# Patient Record
Sex: FEMALE | Race: BLACK OR AFRICAN AMERICAN | NOT HISPANIC OR LATINO | Employment: UNEMPLOYED | URBAN - METROPOLITAN AREA
[De-identification: names, ages, dates, MRNs, and addresses within clinical notes are randomized per-mention and may not be internally consistent; named-entity substitution may affect disease eponyms.]

---

## 2017-10-26 ENCOUNTER — HOSPITAL ENCOUNTER (EMERGENCY)
Facility: HOSPITAL | Age: 27
Discharge: HOME | End: 2017-10-26
Attending: EMERGENCY MEDICINE
Payer: MEDICAID

## 2017-10-26 VITALS
SYSTOLIC BLOOD PRESSURE: 168 MMHG | BODY MASS INDEX: 52.14 KG/M2 | WEIGHT: 258.63 LBS | TEMPERATURE: 98 F | HEIGHT: 59 IN | OXYGEN SATURATION: 100 % | HEART RATE: 85 BPM | DIASTOLIC BLOOD PRESSURE: 76 MMHG | RESPIRATION RATE: 20 BRPM

## 2017-10-26 DIAGNOSIS — L02.416 ABSCESS OF LEFT THIGH: Primary | ICD-10-CM

## 2017-10-26 DIAGNOSIS — L28.2 PRURITIC RASH: ICD-10-CM

## 2017-10-26 PROCEDURE — 99283 EMERGENCY DEPT VISIT LOW MDM: CPT

## 2017-10-26 RX ORDER — FLUOCINONIDE 0.5 MG/G
CREAM TOPICAL 2 TIMES DAILY
Qty: 15 G | Refills: 0 | Status: SHIPPED | OUTPATIENT
Start: 2017-10-26 | End: 2017-11-05

## 2017-10-26 RX ORDER — SULFAMETHOXAZOLE AND TRIMETHOPRIM 800; 160 MG/1; MG/1
2 TABLET ORAL 2 TIMES DAILY
Qty: 28 TABLET | Refills: 0 | Status: SHIPPED | OUTPATIENT
Start: 2017-10-26 | End: 2017-11-02

## 2017-10-26 NOTE — ED PROVIDER NOTES
SCRIBE #1 NOTE: I, Dann Lopez, am scribing for, and in the presence of, Armando Hubbard MD. I have scribed the entire note.      History      Chief Complaint   Patient presents with    Rash     pt states she has a rash on her chest x1 week    Cyst     pt states she has cyst on her L thigh x1 week       Review of patient's allergies indicates:  No Known Allergies     HPI   HPI    10/26/2017, 11:35 AM   History obtained from the patient      History of Present Illness: Luz CULVER is a 27 y.o. female patient who presents to the Emergency Department for an evaluation of a rash which onset gradually x1 week ago. Symptoms are constant and moderate in severity. Exacerbated by nothing and relieved by nothing. Associated sxs include cyst to left thigh for x1 week. Patient denies any fever, chills, difficulty swallowing, tongue swelling, voice change, SOB, and all other sxs at this time. No further complaints or concerns at this time.         Arrival mode: Personal vehicle     PCP: Primary Doctor No       Past Medical History:  Past Medical History:   Diagnosis Date    Diabetes mellitus        Past Surgical History:  Past Surgical History:   Procedure Laterality Date    CERVICAL DISC SURGERY           Family History:  Family history reviewed not relevant      Social History:  Social History     Social History Main Topics    Smoking status: Never Smoker    Smokeless tobacco: Never Used    Alcohol use No    Drug use: No    Sexual activity: Yes     Partners: Male     Birth control/ protection: None       ROS   Review of Systems   Constitutional: Negative for chills, diaphoresis and fever.   HENT: Negative for drooling, facial swelling, sore throat, trouble swallowing and voice change.         (-) Tongue swelling   Respiratory: Negative for cough, chest tightness and shortness of breath.    Cardiovascular: Negative for chest pain.   Gastrointestinal: Negative for nausea and vomiting.   Genitourinary:  "Negative for dysuria and hematuria.   Musculoskeletal: Negative for back pain, neck pain and neck stiffness.   Skin: Positive for rash. Negative for color change and wound.        (+) Cyst   Neurological: Negative for weakness, light-headedness and headaches.   Hematological: Does not bruise/bleed easily.     Physical Exam      Initial Vitals [10/26/17 1131]   BP Pulse Resp Temp SpO2   (!) 193/129 96 18 98.1 °F (36.7 °C) 100 %      MAP       150.33          Physical Exam  Nursing Notes and Vital Signs Reviewed.  Constitutional: Patient is in no acute distress. Well-developed and well-nourished.  Head: Atraumatic. Normocephalic.  Eyes: PERRL. EOM intact. Conjunctivae are not pale. No scleral icterus.  ENT: Mucous membranes are moist. Oropharynx is clear and symmetric.    Neck: Supple. Full ROM. No lymphadenopathy.  Cardiovascular: Regular rate. Regular rhythm.  Pulmonary/Chest: No respiratory distress.  Abdominal: Soft and non-distended.  There is no tenderness.  Musculoskeletal: Moves all extremities. No obvious deformities. No edema. No calf tenderness.  Skin: Warm and dry. 0.5 area of induration noted to left thigh with drainage. No I&D needed. Diffuse urticarial rash to medial aspect  of both breasts.  Neurological:  Alert, awake, and appropriate.  Normal speech.  No acute focal neurological deficits are appreciated.  Psychiatric: Normal affect. Good eye contact. Appropriate in content.    ED Course    Procedures  ED Vital Signs:  Vitals:    10/26/17 1131 10/26/17 1224   BP: (!) 193/129 (!) 168/76   Pulse: 96 85   Resp: 18 20   Temp: 98.1 °F (36.7 °C)    TempSrc: Oral    SpO2: 100%    Weight: 117.3 kg (258 lb 9.6 oz)    Height: 4' 11" (1.499 m)             The Emergency Provider reviewed the vital signs and test results, which are outlined above.    ED Discussion      12:04 PM: Reassessed pt at this time. Pt is awake, alert, and in NAD. Discussed with pt all pertinent ED information. Discussed pt dx of abscess of " left thigh and plan of tx. Gave pt all f/u and return to the ED instructions. All questions and concerns were addressed at this time. Pt expresses understanding of information and instructions, and is comfortable with plan to discharge. Pt is stable for discharge.    Patient is safe for discharge. There is no suggestion of airway or ENT emergency. Patient is hemodynamically stable and there is no suggestion of active anaphylaxis or progressive worsening of current symptoms.      ED Medication(s):  Medications - No data to display    Discharge Medication List as of 10/26/2017 12:02 PM      START taking these medications    Details   fluocinonide 0.05% (LIDEX) 0.05 % cream Apply topically 2 (two) times daily., Starting u 10/26/2017, Until Sun 11/5/2017, Print      sulfamethoxazole-trimethoprim 800-160mg (BACTRIM DS) 800-160 mg Tab Take 2 tablets by mouth 2 (two) times daily., Starting u 10/26/2017, Until u 11/2/2017, Print             Follow-up Information     [facility] in 2 days.    Contact information:  [address] [facility] [address] [phone]                     Medical Decision Making              Scribe Attestation:   Scribe #1: I performed the above scribed service and the documentation accurately describes the services I performed. I attest to the accuracy of the note.    Attending:   Physician Attestation Statement for Scribe #1: I, Armando Hubbard MD, personally performed the services described in this documentation, as scribed by Dann Lopez, in my presence, and it is both accurate and complete.          Clinical Impression       ICD-10-CM ICD-9-CM   1. Abscess of left thigh L02.416 682.6   2. Pruritic rash L28.2 698.2       Disposition:   Disposition: Discharged  Condition: Stable         Armando Hubbard MD  10/26/17 5238

## 2018-03-19 ENCOUNTER — HOSPITAL ENCOUNTER (EMERGENCY)
Facility: HOSPITAL | Age: 28
Discharge: HOME | End: 2018-03-19
Attending: EMERGENCY MEDICINE
Payer: MEDICAID

## 2018-03-19 VITALS
TEMPERATURE: 99 F | OXYGEN SATURATION: 100 % | DIASTOLIC BLOOD PRESSURE: 103 MMHG | BODY MASS INDEX: 52.17 KG/M2 | SYSTOLIC BLOOD PRESSURE: 140 MMHG | RESPIRATION RATE: 16 BRPM | WEIGHT: 258.81 LBS | HEIGHT: 59 IN | HEART RATE: 103 BPM

## 2018-03-19 DIAGNOSIS — L02.416 ABSCESS OF LEG, LEFT: Primary | ICD-10-CM

## 2018-03-19 DIAGNOSIS — I10 ESSENTIAL HYPERTENSION: ICD-10-CM

## 2018-03-19 LAB — POCT GLUCOSE: 124 MG/DL (ref 70–110)

## 2018-03-19 PROCEDURE — 25000003 PHARM REV CODE 250: Performed by: NURSE PRACTITIONER

## 2018-03-19 PROCEDURE — 82962 GLUCOSE BLOOD TEST: CPT

## 2018-03-19 PROCEDURE — 10060 I&D ABSCESS SIMPLE/SINGLE: CPT

## 2018-03-19 PROCEDURE — 99284 EMERGENCY DEPT VISIT MOD MDM: CPT | Mod: 25

## 2018-03-19 RX ORDER — SULFAMETHOXAZOLE AND TRIMETHOPRIM 800; 160 MG/1; MG/1
1 TABLET ORAL 2 TIMES DAILY
Qty: 14 TABLET | Refills: 0 | Status: SHIPPED | OUTPATIENT
Start: 2018-03-19 | End: 2018-03-26

## 2018-03-19 RX ORDER — HYDROCHLOROTHIAZIDE 25 MG/1
25 TABLET ORAL DAILY
Qty: 30 TABLET | Refills: 0 | Status: SHIPPED | OUTPATIENT
Start: 2018-03-19 | End: 2018-04-18

## 2018-03-19 RX ORDER — LIDOCAINE HYDROCHLORIDE 10 MG/ML
10 INJECTION, SOLUTION EPIDURAL; INFILTRATION; INTRACAUDAL; PERINEURAL
Status: COMPLETED | OUTPATIENT
Start: 2018-03-19 | End: 2018-03-19

## 2018-03-19 RX ORDER — TRAMADOL HYDROCHLORIDE 50 MG/1
50 TABLET ORAL EVERY 6 HOURS PRN
Qty: 14 TABLET | Refills: 0 | Status: SHIPPED | OUTPATIENT
Start: 2018-03-19 | End: 2018-03-29

## 2018-03-19 RX ORDER — CLONIDINE HYDROCHLORIDE 0.1 MG/1
0.1 TABLET ORAL
Status: COMPLETED | OUTPATIENT
Start: 2018-03-19 | End: 2018-03-19

## 2018-03-19 RX ADMIN — CLONIDINE HYDROCHLORIDE 0.1 MG: 0.1 TABLET ORAL at 11:03

## 2018-03-19 RX ADMIN — LIDOCAINE HYDROCHLORIDE 100 MG: 10 INJECTION, SOLUTION EPIDURAL; INFILTRATION; INTRACAUDAL at 11:03

## 2018-03-19 NOTE — ED PROVIDER NOTES
Encounter Date: 3/19/2018       History     Chief Complaint   Patient presents with    Abscess     pt c/o abscess or cyst to left thigh and also c/o N/V/D since Friday      27 year old female presents to the ED c/o an abscess to left inner thigh x 6 days and nausea, vomiting, and diarrhea starting this morning.  Pt reports to vomiting twice today.  She denies fever.  Pt with elevated BP today.  Not on HTN medications.  Pt was seen at an Urgent Care Clinic and was given clonidine for elevated BP and given several days worth until she followed up with her PCP.  She did not follow up with PCP.  She denies headache or visual disturbance.  There are no worsening or alleviating factors noted.          Review of patient's allergies indicates:  No Known Allergies  Past Medical History:   Diagnosis Date    Diabetes mellitus      Past Surgical History:   Procedure Laterality Date    CERVICAL DISC SURGERY       History reviewed. No pertinent family history.  Social History   Substance Use Topics    Smoking status: Never Smoker    Smokeless tobacco: Never Used    Alcohol use No     Review of Systems   Constitutional: Negative for chills and fever.   HENT: Negative for sore throat.    Respiratory: Negative for shortness of breath.    Cardiovascular: Negative for chest pain.   Gastrointestinal: Positive for diarrhea, nausea and vomiting.   Genitourinary: Negative for dysuria.   Musculoskeletal: Negative for back pain.   Skin: Negative for rash.        Abscess to left upper, inner thigh.   Neurological: Negative for weakness.   Hematological: Does not bruise/bleed easily.   All other systems reviewed and are negative.      Physical Exam     Initial Vitals [03/19/18 1042]   BP Pulse Resp Temp SpO2   (!) 189/123 102 18 98.8 °F (37.1 °C) 100 %      MAP       145         Physical Exam    Nursing note and vitals reviewed.  Constitutional: She appears well-developed and well-nourished.   HENT:   Head: Normocephalic and atraumatic.    Eyes: Conjunctivae and EOM are normal. Pupils are equal, round, and reactive to light.   Neck: Normal range of motion. Neck supple.   Cardiovascular: Normal rate, regular rhythm, normal heart sounds and intact distal pulses.   Pulmonary/Chest: Breath sounds normal.   Abdominal: Soft. There is no tenderness. There is no rebound and no guarding.   Musculoskeletal: Normal range of motion.   Neurological: She is alert and oriented to person, place, and time. She has normal strength and normal reflexes.   Skin: Skin is warm and dry. Abscess noted.   5cm x 3cm area of induration with 2cm of fluctuance noted to left upper medial thigh.  Mild erythema noted to surrounding area.   Psychiatric: She has a normal mood and affect. Her behavior is normal. Thought content normal.         ED Course   I & D - Incision and Drainage  Date/Time: 3/19/2018 2:02 PM  Performed by: BELTRAN YORK  Authorized by: MIAH CHAUDHARI JR   Comments: Abscess prepped with betadine, injected with 3 cc plain lidocaine, incision made with 11 blade, mild amount of pus expressed, packed with 1/4 inch plain packing        Labs Reviewed   POCT GLUCOSE - Abnormal; Notable for the following:        Result Value    POCT Glucose 124 (*)     All other components within normal limits   CBC W/ AUTO DIFFERENTIAL   COMPREHENSIVE METABOLIC PANEL   POCT GLUCOSE MONITORING CONTINUOUS              Vitals:    03/19/18 1419   BP: (!) 140/103   Pulse: 103   Resp: 16   Temp:               Vitals:    03/19/18 1419   BP: (!) 140/103   Pulse: 103   Resp: 16   Temp:         2:27 PM  Pt does not want to wait for blood work, pt requesting discharge              Clinical Impression:   The primary encounter diagnosis was Abscess of leg, left. A diagnosis of Essential hypertension was also pertinent to this visit.                           Beltran York NP  03/19/18 1428       Beltran York NP  03/19/18 1428       Beltran York NP  03/19/18 1431